# Patient Record
Sex: MALE | Race: WHITE | NOT HISPANIC OR LATINO | URBAN - METROPOLITAN AREA
[De-identification: names, ages, dates, MRNs, and addresses within clinical notes are randomized per-mention and may not be internally consistent; named-entity substitution may affect disease eponyms.]

---

## 2022-07-11 ENCOUNTER — LAB OUTSIDE AN ENCOUNTER (OUTPATIENT)
Dept: URBAN - METROPOLITAN AREA CLINIC 63 | Facility: CLINIC | Age: 32
End: 2022-07-11

## 2022-07-11 ENCOUNTER — OFFICE VISIT (OUTPATIENT)
Dept: URBAN - METROPOLITAN AREA CLINIC 63 | Facility: CLINIC | Age: 32
End: 2022-07-11
Payer: COMMERCIAL

## 2022-07-11 VITALS
SYSTOLIC BLOOD PRESSURE: 110 MMHG | HEART RATE: 114 BPM | DIASTOLIC BLOOD PRESSURE: 60 MMHG | TEMPERATURE: 98 F | HEIGHT: 71 IN | BODY MASS INDEX: 33.74 KG/M2 | OXYGEN SATURATION: 95 % | WEIGHT: 241 LBS

## 2022-07-11 DIAGNOSIS — R10.11 RUQ ABDOMINAL PAIN: ICD-10-CM

## 2022-07-11 DIAGNOSIS — R19.7 DIARRHEA, UNSPECIFIED TYPE: ICD-10-CM

## 2022-07-11 DIAGNOSIS — R63.4 WEIGHT LOSS: ICD-10-CM

## 2022-07-11 DIAGNOSIS — R11.14 BILIOUS VOMITING WITH NAUSEA: ICD-10-CM

## 2022-07-11 PROCEDURE — 99204 OFFICE O/P NEW MOD 45 MIN: CPT | Performed by: PHYSICIAN ASSISTANT

## 2022-07-11 RX ORDER — SERTRALINE HYDROCHLORIDE 25 MG/1
TAKE ONE TABLET BY MOUTH ONE TIME DAILY FOR 16 DAYS TABLET ORAL
Qty: 16 UNSPECIFIED | Refills: 0 | Status: ACTIVE | COMMUNITY

## 2022-07-11 RX ORDER — ALPRAZOLAM 0.25 MG/1
TAKE ONE TABLET BY MOUTH THREE TIMES A DAY AS NEEDED TABLET ORAL
Qty: 90 UNSPECIFIED | Refills: 1 | Status: ACTIVE | COMMUNITY

## 2022-07-11 RX ORDER — DICYCLOMINE HYDROCHLORIDE 10 MG/1
1 CAPSULE CAPSULE ORAL
OUTPATIENT
Start: 2022-07-11 | End: 2022-08-10

## 2022-07-11 RX ORDER — BUPROPION HYDROCHLORIDE 150 MG/1
TAKE ONE TABLET BY MOUTH TWICE A DAY TABLET, EXTENDED RELEASE ORAL
Qty: 60 UNSPECIFIED | Refills: 1 | Status: ACTIVE | COMMUNITY

## 2022-07-11 RX ORDER — PROMETHAZINE HYDROCHLORIDE 12.5 MG/1
1 TABLET AS NEEDED TABLET ORAL
Qty: 120 | OUTPATIENT
Start: 2022-07-11 | End: 2022-08-10

## 2022-07-11 RX ORDER — MODAFINIL 200 MG/1
TABLET ORAL
Qty: 30 TABLET | Status: ACTIVE | COMMUNITY

## 2022-07-11 RX ORDER — VORTIOXETINE 10 MG/1
TABLET, FILM COATED ORAL
Qty: 30 TABLET | Status: ACTIVE | COMMUNITY

## 2022-07-11 RX ORDER — ONDANSETRON 8 MG/1
1 TABLET ON THE TONGUE AND ALLOW TO DISSOLVE  AS NEEDED TABLET, ORALLY DISINTEGRATING ORAL EVERY 8 HOURS
Qty: 90 | Refills: 2 | OUTPATIENT
Start: 2022-07-11

## 2022-07-11 RX ORDER — OMEPRAZOLE 40 MG/1
1 CAPSULE 30 MINUTES BEFORE MORNING MEAL CAPSULE, DELAYED RELEASE ORAL ONCE A DAY
Qty: 30 | OUTPATIENT
Start: 2022-07-11

## 2022-07-11 NOTE — HPI-TODAY'S VISIT:
32-year-old male with MS, anxiety, depression, DECLAN presents to the office for follow-up after having been seen in the ER on July 6, 2022 with right upper quadrant pain and nausea intermittently over the past month.  Labs were done including CBC, CMP, and lipase which were unremarkable with the exception of an elevated ALT to 64.  TSH was normal.  CT scan of the abdomen and pelvis was done which demonstrated no acute inflammatory changes within the abdomen or pelvis.  No cholelithiasis.  No evidence of acute cholecystitis.  Right upper quadrant sonogram showed no acute abnormality.  He was discharged with Zofran to use as needed.    He was seen in the ER on July 3 and at that visit he reported near syncopal episode, headache unintentional weight loss, diarrhea, nausea and vomiting.  Labs were unremarkable with the exception of an elevated ALT to 61.  CT scan of the head was done and was unremarkable.  He was offered admission for cardiology evaluation but he preferred to go home and follow-up in the outpatient setting.  He states over the past month, he has had episodes of RUQ pain associated with N/V/D. He has lost about 43 pounds in about 6 weeks. He states he has not been able to eat anything over the past 5 days becuase any food intake causes severe RUQ pain which radiates to his right shoulder. Pain is described as dull but is very sharp whenever he tries to eat.  He is using zofran as needed but is still having a lot of nausea and dry heaving. His vomiting is bilious. He denies any hematemesis.  He has up to 5 episodes of diarrhea per day. He has no melena or hematochezia. Before 6 weeks ago, he had no GI problems. He usually will have diarrhea within 10 minutes of eating. He works as a paramedic. He has no been on antibiotics recently. He is not using NSAIDs. .  His sister has gallbladder disease. He has no family history of GI malignancy.

## 2022-07-11 NOTE — PHYSICAL EXAM GASTROINTESTINAL
Abdomen , obese, soft, nontender, nondistended , RUQ ttp with voluntary guarding and positive Black sign. Liver and Spleen , no hepatomegaly present , no hepatosplenomegaly , liver nontender , spleen not palpable

## 2022-07-11 NOTE — PHYSICAL EXAM CONSTITUTIONAL:
31 yo  male, sitting in a chair, in moderate distress due to RUQ pain, sweating, bent over holding his RUQ.

## 2022-07-12 ENCOUNTER — CLAIMS CREATED FROM THE CLAIM WINDOW (OUTPATIENT)
Dept: URBAN - METROPOLITAN AREA MEDICAL CENTER 14 | Facility: MEDICAL CENTER | Age: 32
End: 2022-07-12
Payer: COMMERCIAL

## 2022-07-12 DIAGNOSIS — R10.11 RUQ ABDOMINAL PAIN: ICD-10-CM

## 2022-07-12 DIAGNOSIS — F41.9 ANXIETY: ICD-10-CM

## 2022-07-12 DIAGNOSIS — G35 MULTIPLE SCLEROSIS: ICD-10-CM

## 2022-07-12 DIAGNOSIS — K76.0 FATTY LIVER: ICD-10-CM

## 2022-07-12 PROCEDURE — 99233 SBSQ HOSP IP/OBS HIGH 50: CPT | Performed by: INTERNAL MEDICINE

## 2022-07-12 PROCEDURE — 99223 1ST HOSP IP/OBS HIGH 75: CPT | Performed by: INTERNAL MEDICINE

## 2022-07-12 PROCEDURE — 45380 COLONOSCOPY AND BIOPSY: CPT | Performed by: INTERNAL MEDICINE

## 2022-07-19 ENCOUNTER — TELEPHONE ENCOUNTER (OUTPATIENT)
Dept: URBAN - METROPOLITAN AREA CLINIC 63 | Facility: CLINIC | Age: 32
End: 2022-07-19

## 2022-08-10 ENCOUNTER — ERX REFILL RESPONSE (OUTPATIENT)
Dept: URBAN - METROPOLITAN AREA CLINIC 57 | Facility: CLINIC | Age: 32
End: 2022-08-10

## 2022-08-10 RX ORDER — OMEPRAZOLE 40 MG/1
TAKE 1 CAPSULE BY MOUTH EVERY DAY 30 MINUTES BEFORE MORNING MEAL FOR 30 DAYS CAPSULE, DELAYED RELEASE ORAL
Qty: 30 CAPSULE | Refills: 0 | OUTPATIENT

## 2022-08-10 RX ORDER — OMEPRAZOLE 40 MG/1
1 CAPSULE 30 MINUTES BEFORE MORNING MEAL CAPSULE, DELAYED RELEASE ORAL ONCE A DAY
Qty: 30 | OUTPATIENT

## 2022-08-22 ENCOUNTER — TELEPHONE ENCOUNTER (OUTPATIENT)
Dept: URBAN - METROPOLITAN AREA CLINIC 63 | Facility: CLINIC | Age: 32
End: 2022-08-22

## 2022-08-23 ENCOUNTER — OFFICE VISIT (OUTPATIENT)
Dept: URBAN - METROPOLITAN AREA CLINIC 63 | Facility: CLINIC | Age: 32
End: 2022-08-23
Payer: COMMERCIAL

## 2022-08-23 ENCOUNTER — WEB ENCOUNTER (OUTPATIENT)
Dept: URBAN - METROPOLITAN AREA CLINIC 63 | Facility: CLINIC | Age: 32
End: 2022-08-23

## 2022-08-23 ENCOUNTER — LAB OUTSIDE AN ENCOUNTER (OUTPATIENT)
Dept: URBAN - METROPOLITAN AREA CLINIC 63 | Facility: CLINIC | Age: 32
End: 2022-08-23

## 2022-08-23 VITALS
OXYGEN SATURATION: 97 % | SYSTOLIC BLOOD PRESSURE: 120 MMHG | WEIGHT: 242.2 LBS | TEMPERATURE: 98 F | HEIGHT: 71 IN | DIASTOLIC BLOOD PRESSURE: 80 MMHG | HEART RATE: 108 BPM | BODY MASS INDEX: 33.91 KG/M2

## 2022-08-23 DIAGNOSIS — R11.14 BILIOUS VOMITING WITH NAUSEA: ICD-10-CM

## 2022-08-23 DIAGNOSIS — R11.0 NAUSEA: ICD-10-CM

## 2022-08-23 DIAGNOSIS — R10.10 PAIN OF UPPER ABDOMEN: ICD-10-CM

## 2022-08-23 DIAGNOSIS — R19.7 DIARRHEA, UNSPECIFIED TYPE: ICD-10-CM

## 2022-08-23 PROCEDURE — 99214 OFFICE O/P EST MOD 30 MIN: CPT | Performed by: PHYSICIAN ASSISTANT

## 2022-08-23 RX ORDER — BUPROPION HYDROCHLORIDE 150 MG/1
TAKE ONE TABLET BY MOUTH TWICE A DAY TABLET, EXTENDED RELEASE ORAL
Qty: 60 UNSPECIFIED | Refills: 1 | Status: ACTIVE | COMMUNITY

## 2022-08-23 RX ORDER — ALPRAZOLAM 0.25 MG/1
TAKE ONE TABLET BY MOUTH THREE TIMES A DAY AS NEEDED TABLET ORAL
Qty: 90 UNSPECIFIED | Refills: 1 | Status: ACTIVE | COMMUNITY

## 2022-08-23 RX ORDER — OMEPRAZOLE 40 MG/1
TAKE 1 CAPSULE BY MOUTH EVERY DAY 30 MINUTES BEFORE MORNING MEAL FOR 30 DAYS CAPSULE, DELAYED RELEASE ORAL
Qty: 30 CAPSULE | Refills: 0 | Status: ACTIVE | COMMUNITY

## 2022-08-23 RX ORDER — OMEPRAZOLE 40 MG/1
TAKE 1 CAPSULE BY MOUTH EVERY DAY 30 MINUTES BEFORE MORNING MEAL FOR 30 DAYS CAPSULE, DELAYED RELEASE ORAL ONCE A DAY
Qty: 30 | Refills: 3

## 2022-08-23 RX ORDER — MODAFINIL 200 MG/1
TABLET ORAL
Qty: 30 TABLET | Status: ACTIVE | COMMUNITY

## 2022-08-23 RX ORDER — DICYCLOMINE HYDROCHLORIDE 10 MG/1
1 CAPSULE CAPSULE ORAL THREE TIMES A DAY
Qty: 90 | Refills: 2 | OUTPATIENT

## 2022-08-23 RX ORDER — ONDANSETRON 8 MG/1
1 TABLET ON THE TONGUE AND ALLOW TO DISSOLVE  AS NEEDED TABLET, ORALLY DISINTEGRATING ORAL EVERY 8 HOURS
Qty: 90 | Refills: 2 | Status: ACTIVE | COMMUNITY
Start: 2022-07-11

## 2022-08-23 RX ORDER — SERTRALINE HYDROCHLORIDE 25 MG/1
TAKE ONE TABLET BY MOUTH ONE TIME DAILY FOR 16 DAYS TABLET ORAL
Qty: 16 UNSPECIFIED | Refills: 0 | Status: ACTIVE | COMMUNITY

## 2022-08-23 RX ORDER — ONDANSETRON 8 MG/1
1 TABLET ON THE TONGUE AND ALLOW TO DISSOLVE  AS NEEDED TABLET, ORALLY DISINTEGRATING ORAL EVERY 8 HOURS
Qty: 90 | Refills: 2
Start: 2022-07-11

## 2022-08-23 NOTE — HPI-TODAY'S VISIT:
32-year-old male with MS, anxiety, depression, DECLAN initially presented to the office on July 11, 2022 after having been seen in the ER twice on July 3 and July 6 with intermittent right upper quadrant pain, near syncopal episode, headache, unintentional weight loss, diarrhea, nausea, vomiting over 1 month.  His labs were unremarkable with the exception of an elevated ALT to 64.  CT abdomen/pelvis demonstrated no acute findings.  CT head was unremarkable.  Right upper quadrant sonogram demonstrated no acute abnormality.  He was discharged with Zofran.  He was also in the ER on July 3 reporting a near syncopal episode, headache, unintentional weight loss, diarrhea, nausea and vomiting.  Labs were unremarkable. He was having severe pain in the right upper quadrant at his office visit with voluntary guarding and positive Black sign.  He was advised to go to the ER and he was admitted to [facility] on July 11.  He underwent EGD and colonoscopy on July 13.  EGD and colonoscopy were grossly normal.  His gastric biopsy was negative for H. pylori.  Duodenal biopsy was negative for sprue.  Random colon biopsy was normal.  Random TI biopsy was normal.  Celiac panel was done and was negative.  Viral hepatitis profile was negative.  His CRP and sed rate were normal.  Thyroid function tests were unremarkable.  Lipase was normal. He had a repeat CT abdomen/pelvis and right upper quadrant sonogram which did not show any acute pathology.  PIPIDA was done which demonstrated a diminished gallbladder ejection fraction of 20%.  He was seen by general surgery and underwent cholecystectomy on August 5, 2022. He was in the ER on August 17 reporting lightheadedness, nausea, and vomiting.  Another CT abdomen/pelvis was done which was again unremarkable.  Chest x-ray showed no acute abnormality.  Labs were unremarkable with the exception of an elevation of his AST and ALT to 62 and 123 respectively.  INR was normal.  Troponin negative x1.  TSH was normal.  He reported that he accidentally took a double dose of his Trintellix which was thought to be the cause of his symptoms.  He was advised to follow-up with his PCP and with his surgeon. He was back in the ER on August 21 because he had persistent nausea since the previous ER visit on August 17.  He was given antiemetics.  Labs were repeated.  And were normal with the exception of an elevated ALT to 102. He presents back to the office on an urgent basis for evaluation of abdominal pain.  He reports dull epigastric pain which has been going on every day for the past week. States last week on Monday, he felt fine. He woke up the next day with upper abdominal pain. He describes the pain as dull but states it is severe and debilitating. Pain is unchanged with eating. He wakes up in the morning with pain and it wll last until late afternoon. Getting up and moving around will sometimes help. He has had soft stools and diarrhea since his colonoscopy. States he had been having diarrhea after meals 3-4 times daily. He took imodium and Pepto bismol without any noticeable improvment.  He has persistent nausea every day. He is not vomiting. He has been using zofran as needed which is helpful. He is requesting refills. He continues to use omeprazole 40mg daily. He does not drink alcohol.

## 2022-08-25 ENCOUNTER — TELEPHONE ENCOUNTER (OUTPATIENT)
Dept: URBAN - METROPOLITAN AREA CLINIC 63 | Facility: CLINIC | Age: 32
End: 2022-08-25

## 2022-08-26 LAB
A/G RATIO: 2
ALBUMIN: 5.1
ALKALINE PHOSPHATASE: 81
ALT (SGPT): 86
AST (SGOT): 41
BILIRUBIN, TOTAL: 0.7
BUN/CREATININE RATIO: (no result)
BUN: 11
CALCIUM: 10.3
CARBON DIOXIDE, TOTAL: 24
CHLORIDE: 104
CREATININE: 1.03
EGFR: 100
GLOBULIN, TOTAL: 2.5
GLUCOSE: 95
HEMATOCRIT: 43.8
HEMOGLOBIN: 14.9
MCH: 29.8
MCHC: 34
MCV: 87.6
MPV: 10.2
PLATELET COUNT: 299
POTASSIUM: 4
PROTEIN, TOTAL: 7.6
RDW: 12.9
RED BLOOD CELL COUNT: 5
SODIUM: 139
WHITE BLOOD CELL COUNT: 5.4

## 2022-09-01 LAB
CAMPYLOBACTER SPP. AG,EIA: (no result)
CLOSTRIDIUM DIFFICILE TOXINB,QL REAL TIME PCR: NOT DETECTED
GIARDIA LAMBLIA AG, EIA: (no result)
OVA AND PARASITES, CONC AND PERM SMEAR: (no result)
PANCREATIC ELASTASE, FECAL: >500
SALMONELLA AND SHIGELLA, CULTURE: (no result)
SHIGA TOXINS, EIA W/RFL TO E.COLI O157 CULTURE: (no result)

## 2022-09-08 ENCOUNTER — OFFICE VISIT (OUTPATIENT)
Dept: URBAN - METROPOLITAN AREA CLINIC 63 | Facility: CLINIC | Age: 32
End: 2022-09-08
Payer: COMMERCIAL

## 2022-09-08 VITALS
TEMPERATURE: 98.2 F | DIASTOLIC BLOOD PRESSURE: 84 MMHG | HEART RATE: 91 BPM | WEIGHT: 239.6 LBS | HEIGHT: 71 IN | BODY MASS INDEX: 33.54 KG/M2 | SYSTOLIC BLOOD PRESSURE: 122 MMHG | OXYGEN SATURATION: 98 %

## 2022-09-08 DIAGNOSIS — R74.8 ABNORMAL LIVER ENZYMES: ICD-10-CM

## 2022-09-08 PROCEDURE — 99213 OFFICE O/P EST LOW 20 MIN: CPT | Performed by: INTERNAL MEDICINE

## 2022-09-08 RX ORDER — OMEPRAZOLE 40 MG/1
TAKE 1 CAPSULE BY MOUTH EVERY DAY 30 MINUTES BEFORE MORNING MEAL FOR 30 DAYS CAPSULE, DELAYED RELEASE ORAL ONCE A DAY
Qty: 30 | Refills: 3 | Status: ACTIVE | COMMUNITY

## 2022-09-08 RX ORDER — ALPRAZOLAM 0.25 MG/1
TAKE ONE TABLET BY MOUTH THREE TIMES A DAY AS NEEDED TABLET ORAL
Qty: 90 UNSPECIFIED | Refills: 1 | Status: ACTIVE | COMMUNITY

## 2022-09-08 RX ORDER — BUPROPION HYDROCHLORIDE 150 MG/1
TAKE ONE TABLET BY MOUTH TWICE A DAY TABLET, EXTENDED RELEASE ORAL
Qty: 60 UNSPECIFIED | Refills: 1 | Status: ACTIVE | COMMUNITY

## 2022-09-08 RX ORDER — MODAFINIL 200 MG/1
TABLET ORAL
Qty: 30 TABLET | Status: ACTIVE | COMMUNITY

## 2022-09-08 RX ORDER — SERTRALINE HYDROCHLORIDE 25 MG/1
TAKE ONE TABLET BY MOUTH ONE TIME DAILY FOR 16 DAYS TABLET ORAL
Qty: 16 UNSPECIFIED | Refills: 0 | Status: ACTIVE | COMMUNITY

## 2022-09-08 NOTE — HPI-TODAY'S VISIT:
Jeffrey was initially seen in our office 07/2022.  Prior to being seen in our office, he had been evaluated in the emergency room a few times.  At his initial office visit he was complaining of right upper quadrant abdominal pain, nausea, vomiting, diarrhea, and weight loss.  Work-up has included labs, ultrasound, HIDA scan, CT scan, and EGD, and colonoscopy.  Labs have been unremarkable for elevation in transaminases.  Ultrasound 07/06/2022 showed small polyps in the gallbladder but was otherwise unremarkable.  CT abdomen and pelvis 07/06/2022, 07/11/2022, and 08/17/2022 were unremarkable without cause for his symptoms.  HIDA scan 07/12/2022 was negative for cholecystitis but showed a diminished ejection fraction of 20%.  EGD 07/13/2022 with Dr. Reid was normal.  Biopsies were negative for H. pylori and celiac disease.  Colonoscopy 07/13/2022 with Dr. Reid was normal.  Colon biopsies were normal.  He had cholecystectomy 08/05/2022 with Dr. Maciel. He presents today for follow up. He is doing better. His RUQ pain has significantly improved and almost completely resolved. He denies having any pain at this time. Denies nausea or vomiting. He is tolerating a diet. On average, he is moving his bowels 3-4 times daily, but denies diarrhea. Denies blood in the stool.

## 2022-12-07 PROBLEM — 197321007 FATTY LIVER: Status: ACTIVE | Noted: 2022-12-07

## 2022-12-07 PROBLEM — 24700007 MULTIPLE SCLEROSIS: Status: ACTIVE | Noted: 2022-12-07

## 2022-12-07 PROBLEM — 48694002 ANXIETY: Status: ACTIVE | Noted: 2022-12-07

## 2022-12-08 ENCOUNTER — OFFICE VISIT (OUTPATIENT)
Dept: URBAN - METROPOLITAN AREA CLINIC 63 | Facility: CLINIC | Age: 32
End: 2022-12-08

## 2022-12-08 ENCOUNTER — DASHBOARD ENCOUNTERS (OUTPATIENT)
Age: 32
End: 2022-12-08

## 2022-12-08 ENCOUNTER — WEB ENCOUNTER (OUTPATIENT)
Dept: URBAN - METROPOLITAN AREA CLINIC 63 | Facility: CLINIC | Age: 32
End: 2022-12-08

## 2022-12-08 VITALS
WEIGHT: 230 LBS | TEMPERATURE: 97.6 F | OXYGEN SATURATION: 97 % | DIASTOLIC BLOOD PRESSURE: 80 MMHG | HEART RATE: 72 BPM | SYSTOLIC BLOOD PRESSURE: 120 MMHG | HEIGHT: 71 IN | BODY MASS INDEX: 32.2 KG/M2

## 2022-12-08 RX ORDER — ALPRAZOLAM 0.25 MG/1
TAKE ONE TABLET BY MOUTH THREE TIMES A DAY AS NEEDED TABLET ORAL
Qty: 90 UNSPECIFIED | Refills: 1 | Status: ACTIVE | COMMUNITY

## 2022-12-08 RX ORDER — OMEPRAZOLE 40 MG/1
TAKE 1 CAPSULE BY MOUTH EVERY DAY 30 MINUTES BEFORE MORNING MEAL FOR 30 DAYS CAPSULE, DELAYED RELEASE ORAL ONCE A DAY
Qty: 30 | Refills: 3 | Status: ON HOLD | COMMUNITY

## 2022-12-08 RX ORDER — BUPROPION HYDROCHLORIDE 150 MG/1
TAKE ONE TABLET BY MOUTH TWICE A DAY TABLET, EXTENDED RELEASE ORAL
Qty: 60 UNSPECIFIED | Refills: 1 | Status: ACTIVE | COMMUNITY

## 2022-12-08 RX ORDER — MIRTAZAPINE 15 MG/1
1 TABLET AT BEDTIME TABLET, FILM COATED ORAL ONCE A DAY
Status: ACTIVE | COMMUNITY

## 2022-12-08 RX ORDER — MODAFINIL 200 MG/1
TABLET ORAL
Qty: 30 TABLET | Status: ACTIVE | COMMUNITY

## 2022-12-08 NOTE — HPI-TODAY'S VISIT:
32-year-old male was initially seen in the office in July 2022 after having been evaluated in the emergency room a few times with complaints of right upper quadrant abdominal pain, nausea, vomiting, diarrhea and weight loss.  His work-up included labs, ultrasound, HIDA scan, CT scan, EGD, and colonoscopy.  His labs have been unremarkable except for an elevation of his transaminases.  His ultrasound showed small polyps in the gallbladder but was otherwise unremarkable.  CT abdomen and pelvis was done 3 times and was unremarkable without cause for his symptoms.  HIDA scan was negative for cholecystitis but showed a diminished ejection fraction of 20%.  EGD July 13, 2022 with Dr. Reid was normal.  Biopsies were negative for H. pylori and celiac disease.  Colonoscopy with Dr. Reid was normal.  Colon biopsies were normal.  He had a cholecystectomy in August 2022 with Dr. Aditi Erickson.  He was last seen in the office in September 2022 doing well.  His right upper quadrant pain had significantly improved and ALT was almost completely resolved.  He denied any abdominal pain, nausea, or vomiting.  He denied any diarrhea or blood in the stool. He presents back to the office today for lab follow up. Repeat LFTs were ordered but were not completed.